# Patient Record
Sex: MALE | Race: WHITE | NOT HISPANIC OR LATINO | Employment: UNEMPLOYED | URBAN - METROPOLITAN AREA
[De-identification: names, ages, dates, MRNs, and addresses within clinical notes are randomized per-mention and may not be internally consistent; named-entity substitution may affect disease eponyms.]

---
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
## 2024-01-19 ENCOUNTER — APPOINTMENT (OUTPATIENT)
Dept: GENERAL RADIOLOGY | Facility: HOSPITAL | Age: 19
End: 2024-01-19
Payer: MEDICAID

## 2024-01-19 ENCOUNTER — HOSPITAL ENCOUNTER (OUTPATIENT)
Facility: HOSPITAL | Age: 19
Discharge: HOME | End: 2024-01-19
Attending: EMERGENCY MEDICINE
Payer: MEDICAID

## 2024-01-19 VITALS
DIASTOLIC BLOOD PRESSURE: 81 MMHG | HEART RATE: 90 BPM | HEIGHT: 72 IN | TEMPERATURE: 97.9 F | OXYGEN SATURATION: 98 % | BODY MASS INDEX: 20.32 KG/M2 | WEIGHT: 150 LBS | RESPIRATION RATE: 16 BRPM | SYSTOLIC BLOOD PRESSURE: 149 MMHG

## 2024-01-19 DIAGNOSIS — M77.12 LATERAL EPICONDYLITIS OF LEFT ELBOW: Primary | ICD-10-CM

## 2024-01-19 PROCEDURE — G0463 HOSPITAL OUTPT CLINIC VISIT: HCPCS | Performed by: NURSE PRACTITIONER

## 2024-01-19 PROCEDURE — 73080 X-RAY EXAM OF ELBOW: CPT

## 2024-01-19 PROCEDURE — 99213 OFFICE O/P EST LOW 20 MIN: CPT | Performed by: NURSE PRACTITIONER

## 2024-01-19 PROCEDURE — 73090 X-RAY EXAM OF FOREARM: CPT

## 2024-01-19 RX ORDER — MELOXICAM 15 MG/1
15 TABLET ORAL DAILY
Qty: 14 TABLET | Refills: 0 | Status: SHIPPED | OUTPATIENT
Start: 2024-01-19 | End: 2024-02-02

## 2024-01-19 NOTE — FSED PROVIDER NOTE
Subjective   History of Present Illness  Patient is a 18-year-old male presents to the ER with left forearm pain and left elbow pain when he moves a certain way, patient reports it sends a pinching and shooting pain into his elbow for the past 3 days. Patient denies any injury or trauma to the left arm. Patient reports he does do a lot of repetitive work with arm, wrist and hands,     History provided by:  Patient   used: No        Review of Systems   Musculoskeletal:         Left forearm and left elbow pain,        History reviewed. No pertinent past medical history.    No Known Allergies    Past Surgical History:   Procedure Laterality Date    CLAVICLE SURGERY      WRIST SURGERY         History reviewed. No pertinent family history.    Social History     Socioeconomic History    Marital status: Single    Number of children: 0    Years of education: 12   Tobacco Use    Smoking status: Every Day    Smokeless tobacco: Never   Vaping Use    Vaping Use: Every day    Substances: Nicotine    Devices: Disposable   Substance and Sexual Activity    Alcohol use: Yes     Comment: rare    Drug use: Yes     Types: Marijuana           Objective   Physical Exam  Vitals and nursing note reviewed.   Constitutional:       Appearance: Normal appearance.   Musculoskeletal:         General: Normal range of motion.        Arms:       Comments: Patient with pain in the left forearm and left elbow for the past 3 days. Patient denies any injury or trauma to the left arm or elbow. Patient with no swelling, no bruising noted or skin discoloration noted. Patient with CMS intact, cap refill less than 3 seconds, radial pulses noted. Patient with full ROM of left elbow and left wrist    Skin:     General: Skin is warm and dry.   Neurological:      General: No focal deficit present.      Mental Status: He is alert and oriented to person, place, and time.   Psychiatric:         Mood and Affect: Mood normal.         Behavior:  Behavior normal. Behavior is cooperative.         Procedures           ED Course  ED Course as of 01/19/24 1938 Fri Jan 19, 2024 1929 XR FOREARM 2 VW LEFT, XR ELBOW 3+ VW LEFT     Date of Exam: 1/19/2024 6:48 PM EST     Indication: pain left forearm and elbow area for the past 3 days, no hx of injury     Comparison: None available.     Findings:  There is no radiographic evidence of acute fracture or dislocation. Joint spaces are preserved. No focal soft tissue abnormalities identified.     IMPRESSION:  Impression:  No radiographic evidence of acute fracture or dislocation.      [DS]   1929 XR FOREARM 2 VW LEFT, XR ELBOW 3+ VW LEFT     Date of Exam: 1/19/2024 6:48 PM EST     Indication: pain left forearm and elbow area for the past 3 days, no hx of injury     Comparison: None available.     Findings:  There is no radiographic evidence of acute fracture or dislocation. Joint spaces are preserved. No focal soft tissue abnormalities identified.     IMPRESSION:  Impression:  No radiographic evidence of acute fracture or dislocatio   [DS]      ED Course User Index  [DS] Christy Dan APRN                                           Medical Decision Making  Patient with pain in the left forearm and left elbow for the past 3 days. Patient denies any injury or trauma to the left arm or elbow. Patient with no swelling, no bruising noted or skin discoloration noted. Patient with CMS intact, cap refill less than 3 seconds, radial pulses noted. Patient with full ROM of left elbow and left wrist. Patient reports he does do repetitive work with his hands, wrist and arms at work.     Xray is unremarkable at this time.     Amount and/or Complexity of Data Reviewed  Radiology: ordered. Decision-making details documented in ED Course.    Risk  Prescription drug management.        Final diagnoses:   Lateral epicondylitis of left elbow       ED Disposition  ED Disposition       ED Disposition   Discharge    Condition   Stable     Comment   --               Dukes, Darra, NP-C  [address] [place] [address] [phone]    Schedule an appointment as soon as possible for a visit in 1 week  As needed, If symptoms worsen         Medication List        New Prescriptions      meloxicam 15 MG tablet  Commonly known as: MOBIC  Take 1 tablet by mouth Daily for 14 days.               Where to Get Your Medications        These medications were sent to [facility]/pharmacy #[id number] - [place], [place] - [address] [place] [address] - [phone]  - [phone] [address] [place] [address]      Hours: 24-hours Phone: [phone]   meloxicam 15 MG tablet

## 2024-01-20 NOTE — DISCHARGE INSTRUCTIONS
Follow-up with primary care for further evaluation and treatment as needed and/or possible referral to orthopedics.     Tylenol as needed for pain.     Meloxicam daily for the next 14 days, do not take any addition motrin, aleve, ibuprofen, naproxen with this medication.     You can also try a tennis elbow/brace that you can get at pharmacy to see if this helps.     Make sure patient is drinking plenty of fluids.    Return for any new or worsening symptoms.  If you have increased fevers that do not respond to Tylenol or Motrin, if you develop nausea, vomiting or diarrhea you need to be reevaluated.